# Patient Record
Sex: MALE | HISPANIC OR LATINO | Employment: UNEMPLOYED | URBAN - METROPOLITAN AREA
[De-identification: names, ages, dates, MRNs, and addresses within clinical notes are randomized per-mention and may not be internally consistent; named-entity substitution may affect disease eponyms.]

---

## 2017-01-27 ENCOUNTER — APPOINTMENT (OUTPATIENT)
Dept: RADIOLOGY | Facility: MEDICAL CENTER | Age: 22
End: 2017-01-27
Attending: EMERGENCY MEDICINE

## 2017-01-27 ENCOUNTER — HOSPITAL ENCOUNTER (EMERGENCY)
Facility: MEDICAL CENTER | Age: 22
End: 2017-01-27
Attending: EMERGENCY MEDICINE

## 2017-01-27 VITALS
SYSTOLIC BLOOD PRESSURE: 121 MMHG | WEIGHT: 127.87 LBS | HEIGHT: 66 IN | RESPIRATION RATE: 18 BRPM | TEMPERATURE: 98.2 F | HEART RATE: 73 BPM | BODY MASS INDEX: 20.55 KG/M2 | OXYGEN SATURATION: 98 % | DIASTOLIC BLOOD PRESSURE: 59 MMHG

## 2017-01-27 DIAGNOSIS — R10.13 EPIGASTRIC PAIN: ICD-10-CM

## 2017-01-27 LAB
ALBUMIN SERPL BCP-MCNC: 5.3 G/DL (ref 3.2–4.9)
ALBUMIN/GLOB SERPL: 1.8 G/DL
ALP SERPL-CCNC: 128 U/L (ref 30–99)
ALT SERPL-CCNC: 14 U/L (ref 2–50)
ANION GAP SERPL CALC-SCNC: 12 MMOL/L (ref 0–11.9)
AST SERPL-CCNC: 21 U/L (ref 12–45)
BASOPHILS # BLD AUTO: 0.1 % (ref 0–1.8)
BASOPHILS # BLD: 0.02 K/UL (ref 0–0.12)
BILIRUB SERPL-MCNC: 0.9 MG/DL (ref 0.1–1.5)
BUN SERPL-MCNC: 18 MG/DL (ref 8–22)
CALCIUM SERPL-MCNC: 10.2 MG/DL (ref 8.5–10.5)
CHLORIDE SERPL-SCNC: 103 MMOL/L (ref 96–112)
CO2 SERPL-SCNC: 21 MMOL/L (ref 20–33)
CREAT SERPL-MCNC: 0.96 MG/DL (ref 0.5–1.4)
EOSINOPHIL # BLD AUTO: 0 K/UL (ref 0–0.51)
EOSINOPHIL NFR BLD: 0 % (ref 0–6.9)
ERYTHROCYTE [DISTWIDTH] IN BLOOD BY AUTOMATED COUNT: 39.2 FL (ref 35.9–50)
GFR SERPL CREATININE-BSD FRML MDRD: >60 ML/MIN/1.73 M 2
GLOBULIN SER CALC-MCNC: 3 G/DL (ref 1.9–3.5)
GLUCOSE SERPL-MCNC: 103 MG/DL (ref 65–99)
HCT VFR BLD AUTO: 41.6 % (ref 42–52)
HGB BLD-MCNC: 14.7 G/DL (ref 14–18)
IMM GRANULOCYTES # BLD AUTO: 0.06 K/UL (ref 0–0.11)
IMM GRANULOCYTES NFR BLD AUTO: 0.4 % (ref 0–0.9)
LIPASE SERPL-CCNC: 29 U/L (ref 11–82)
LYMPHOCYTES # BLD AUTO: 1 K/UL (ref 1–4.8)
LYMPHOCYTES NFR BLD: 6.8 % (ref 22–41)
MCH RBC QN AUTO: 31.4 PG (ref 27–33)
MCHC RBC AUTO-ENTMCNC: 35.3 G/DL (ref 33.7–35.3)
MCV RBC AUTO: 88.9 FL (ref 81.4–97.8)
MONOCYTES # BLD AUTO: 0.54 K/UL (ref 0–0.85)
MONOCYTES NFR BLD AUTO: 3.7 % (ref 0–13.4)
NEUTROPHILS # BLD AUTO: 13.06 K/UL (ref 1.82–7.42)
NEUTROPHILS NFR BLD: 89 % (ref 44–72)
NRBC # BLD AUTO: 0 K/UL
NRBC BLD AUTO-RTO: 0 /100 WBC
PLATELET # BLD AUTO: 216 K/UL (ref 164–446)
PMV BLD AUTO: 10.4 FL (ref 9–12.9)
POTASSIUM SERPL-SCNC: 3.8 MMOL/L (ref 3.6–5.5)
PROT SERPL-MCNC: 8.3 G/DL (ref 6–8.2)
RBC # BLD AUTO: 4.68 M/UL (ref 4.7–6.1)
SODIUM SERPL-SCNC: 136 MMOL/L (ref 135–145)
WBC # BLD AUTO: 14.7 K/UL (ref 4.8–10.8)

## 2017-01-27 PROCEDURE — 700105 HCHG RX REV CODE 258: Performed by: EMERGENCY MEDICINE

## 2017-01-27 PROCEDURE — 36415 COLL VENOUS BLD VENIPUNCTURE: CPT

## 2017-01-27 PROCEDURE — 96361 HYDRATE IV INFUSION ADD-ON: CPT

## 2017-01-27 PROCEDURE — 80053 COMPREHEN METABOLIC PANEL: CPT

## 2017-01-27 PROCEDURE — 74177 CT ABD & PELVIS W/CONTRAST: CPT

## 2017-01-27 PROCEDURE — 96366 THER/PROPH/DIAG IV INF ADDON: CPT

## 2017-01-27 PROCEDURE — 99284 EMERGENCY DEPT VISIT MOD MDM: CPT

## 2017-01-27 PROCEDURE — A9270 NON-COVERED ITEM OR SERVICE: HCPCS | Performed by: EMERGENCY MEDICINE

## 2017-01-27 PROCEDURE — 700117 HCHG RX CONTRAST REV CODE 255: Performed by: EMERGENCY MEDICINE

## 2017-01-27 PROCEDURE — 83690 ASSAY OF LIPASE: CPT

## 2017-01-27 PROCEDURE — 700102 HCHG RX REV CODE 250 W/ 637 OVERRIDE(OP): Performed by: EMERGENCY MEDICINE

## 2017-01-27 PROCEDURE — 85025 COMPLETE CBC W/AUTO DIFF WBC: CPT

## 2017-01-27 PROCEDURE — 96365 THER/PROPH/DIAG IV INF INIT: CPT

## 2017-01-27 PROCEDURE — 700111 HCHG RX REV CODE 636 W/ 250 OVERRIDE (IP): Performed by: EMERGENCY MEDICINE

## 2017-01-27 RX ORDER — METOCLOPRAMIDE 10 MG/1
10 TABLET ORAL ONCE
Status: COMPLETED | OUTPATIENT
Start: 2017-01-27 | End: 2017-01-27

## 2017-01-27 RX ORDER — SODIUM CHLORIDE 9 MG/ML
2000 INJECTION, SOLUTION INTRAVENOUS ONCE
Status: COMPLETED | OUTPATIENT
Start: 2017-01-27 | End: 2017-01-27

## 2017-01-27 RX ORDER — MORPHINE SULFATE 4 MG/ML
4 INJECTION, SOLUTION INTRAMUSCULAR; INTRAVENOUS ONCE
Status: COMPLETED | OUTPATIENT
Start: 2017-01-27 | End: 2017-01-27

## 2017-01-27 RX ORDER — KETOROLAC TROMETHAMINE 30 MG/ML
30 INJECTION, SOLUTION INTRAMUSCULAR; INTRAVENOUS ONCE
Status: DISCONTINUED | OUTPATIENT
Start: 2017-01-27 | End: 2017-01-27

## 2017-01-27 RX ORDER — ONDANSETRON 2 MG/ML
4 INJECTION INTRAMUSCULAR; INTRAVENOUS ONCE
Status: COMPLETED | OUTPATIENT
Start: 2017-01-27 | End: 2017-01-27

## 2017-01-27 RX ADMIN — SODIUM CHLORIDE 2000 ML: 9 INJECTION, SOLUTION INTRAVENOUS at 11:20

## 2017-01-27 RX ADMIN — FAMOTIDINE 20 MG: 10 INJECTION INTRAVENOUS at 11:50

## 2017-01-27 RX ADMIN — IOHEXOL 80 ML: 350 INJECTION, SOLUTION INTRAVENOUS at 12:24

## 2017-01-27 RX ADMIN — METOCLOPRAMIDE 10 MG: 10 TABLET ORAL at 11:57

## 2017-01-27 RX ADMIN — MORPHINE SULFATE 4 MG: 4 INJECTION INTRAVENOUS at 11:55

## 2017-01-27 RX ADMIN — ONDANSETRON 4 MG: 2 INJECTION, SOLUTION INTRAMUSCULAR; INTRAVENOUS at 11:45

## 2017-01-27 ASSESSMENT — PAIN SCALES - GENERAL
PAINLEVEL_OUTOF10: 4
PAINLEVEL_OUTOF10: 0

## 2017-01-27 ASSESSMENT — LIFESTYLE VARIABLES: DO YOU DRINK ALCOHOL: NO

## 2017-01-27 NOTE — ED NOTES
Pt amb to triage.  Chief Complaint   Patient presents with   • Abdominal Pain     cenralized   • Nausea     Symptoms since last night.  Pt given urine collection supplies. Instructed on clean catch technique.  Pt asked to wait in lobby. Advised of wait time and triage process. Advised to alert RN w/ any changes in condition. Thanked for patience.

## 2017-01-27 NOTE — DISCHARGE INSTRUCTIONS
The CT scan and your white blood cell count suggests possible early appendicitis however your symptoms do not seem to suggest appendicitis. Therefore we will let you go home tonight but you should return here 1st thing in the morning for mandatory reexamination. You should return here at once if you have recurrent pain or fever or any new or worsening symptoms or concerns.

## 2017-01-27 NOTE — ED NOTES
".  Chief Complaint   Patient presents with   • Abdominal Pain     cenralized   • Nausea   Sudden onset (above umbilicus) at 2200 Thursday evening. Pt states \" pain feels more like a cramp.\" Pain constant. No nausea at this time. Last urine output and BM yesterday. Fever? + chills.     "

## 2017-01-27 NOTE — ED NOTES
All lines and monitors disconnected.  Discharge instructions reviewed, questions answered.  Pt to geovanna, escorted by RN.  Pt states all belongings in possession.

## 2017-01-27 NOTE — ED AVS SNAPSHOT
[employer] Access Code: [email or web address]  Expires: 2/26/2017  3:01 PM    Your email address is not on file at [facility].  Email Addresses are required for you to sign up for [employer], please contact [phone] to verify your personal information and to provide your email address prior to attempting to register for [employer].    Orville Pinto  [address] [place] [address]    [employer]  A secure, online tool to manage your health information     [facility]’s [employer]® is a secure, online tool that connects you to your personalized health information from the privacy of your home -- day or night - making it very easy for you to manage your healthcare. Once the activation process is completed, you can even access your medical information using the [employer] edgard, which is available for free in the Apple Edgard store or Google Play store.     To learn more about [employer], visit [email or web address]    There are two levels of access available (as shown below):   My Chart Features  [facility] Primary Care Doctor [facility]  Specialists [facility]  Urgent  Care Non-[facility] Primary Care Doctor   Email your healthcare team securely and privately 24/7 X X X    Manage appointments: schedule your next appointment; view details of past/upcoming appointments X      Request prescription refills. X      View recent personal medical records, including lab and immunizations X X X X   View health record, including health history, allergies, medications X X X X   Read reports about your outpatient visits, procedures, consult and ER notes X X X X   See your discharge summary, which is a recap of your hospital and/or ER visit that includes your diagnosis, lab results, and care plan X X  X     How to register for [employer]:  Once your e-mail address has been verified, follow the following steps to sign up for [employer].     1. Go to  [email or web address]  2. Click on the Sign Up Now box, which takes you to the New Member Sign Up page. You will  need to provide the following information:  a. Enter your [employer] Access Code exactly as it appears at the top of this page. (You will not need to use this code after you’ve completed the sign-up process. If you do not sign up before the expiration date, you must request a new code.)   b. Enter your date of birth.   c. Enter your home email address.   d. Click Submit, and follow the next screen’s instructions.  3. Create a [employer] ID. This will be your [employer] login ID and cannot be changed, so think of one that is secure and easy to remember.  4. Create a [employer] password. You can change your password at any time.  5. Enter your Password Reset Question and Answer. This can be used at a later time if you forget your password.   6. Enter your e-mail address. This allows you to receive e-mail notifications when new information is available in [employer].  7. Click Sign Up. You can now view your health information.    For assistance activating your [employer] account, call [phone]

## 2017-01-27 NOTE — ED PROVIDER NOTES
"ED Provider Note    CHIEF COMPLAINT  Chief Complaint   Patient presents with   • Abdominal Pain     cenralized   • Nausea       HPI  Orville Pinto is a 21 y.o. male who presents to the emergency department complaining of epigastric abdominal pain. Symptoms came on approximately 10 PM last night after eating a hotdog and the patient tried Pepto-Bismol and T and that really didn't seem to help. The patient has been feeling constipated. He's had some slight nausea and chills but no fever. The patient's last bowel movement was yesterday during the daytime. He is also noted decreased urine output since yesterday. The patient says that a couple of years ago he was seen in the emergency department with right lower quadrant abdominal pain which resolved and required no further interventions.    REVIEW OF SYSTEMS he has had chills but no fever, nausea but no vomiting, he's had some constipation but no diarrhea. No testicular or scrotal pain. At no time has he had pain in the lower abdomen. All other systems negative    PAST MEDICAL HISTORY  No past medical history on file.    FAMILY HISTORY  No family history on file.    SOCIAL HISTORY  Social History     Social History   • Marital Status: Single     Spouse Name: N/A   • Number of Children: N/A   • Years of Education: N/A     Social History Main Topics   • Smoking status: Former Smoker   • Smokeless tobacco: Not on file   • Alcohol Use: No   • Drug Use: No   • Sexual Activity: Not on file     Other Topics Concern   • Not on file     Social History Narrative   • No narrative on file       SURGICAL HISTORY  No past surgical history on file.    CURRENT MEDICATIONS  Home Medications     **Home medications have not yet been reviewed for this encounter**          ALLERGIES  No Known Allergies    PHYSICAL EXAM  VITAL SIGNS: /52 mmHg  Pulse 71  Temp(Src) 36.8 °C (98.2 °F) (Temporal)  Resp 18  Ht 1.676 m (5' 6\")  Wt 58 kg (127 lb 13.9 oz)  BMI 20.65 kg/m2  SpO2 98% "   Oxygen saturation is interpreted as adequate  Constitutional: Awake and well-appearing individual in no distress  HENT: Mucous membranes are moist and throat clear  Eyes: Erythema or discharge or jaundice  Neck: Trachea midline no JVD no meningeal findings  Cardiovascular: Regular rate and rhythm  Lungs: Clear and equal bilaterally with no apparent difficulty breathing  Abdomen/Back: Soft and nondistended with no rebound or guarding or peritoneal findings. The patient says his discomfort is mostly in the epigastrium but he is not tender in that area and he has absolutely no lower quadrant tenderness. Bowel sounds are normally active.  Skin: Warm and dry  Musculoskeletal: No acute bony deformity  Neurologic: Awake and verbal and moving all extremities    Laboratory  A CBC shows a white blood cell count slightly elevated at 14.7, complete metabolic panel is generally unremarkable except for an alk phos of 128 and the lipase is normal at 29.    Radiology  CT-ABDOMEN-PELVIS WITH   Final Result      1.  Minimal dilatation of the appendix. Possible appendicolith at the orifice of the appendix.      2.  No periappendiceal fat infiltration. No free fluid or abscess.      3.  Possible early appendicitis. Correlation with physical exam is recommended.      4.  Otherwise negative CT abdomen and pelvis.        MEDICAL DECISION MAKING and DISPOSITION  In the emergency department an IV was established the patient was given intravenous fluids as well as intravenous morphine and Pepcid and Zofran and Reglan. The morphine was administered at 11:55 AM. I have reviewed all the findings with the patient and he states that he is completely pain-free. I have reexamined him and he has absolutely no tenderness anywhere in the abdomen. I was able to palpate deeply and vigorously in all 4 abdominal quadrants and the epigastrium and I cannot find any tenderness anywhere. I had the patient stand up and jump up and down at the bedside and he  says he has no pain. I have reviewed all the findings with the patient including the fact that he has a slightly elevated white blood cell count and the CT findings suggesting possible early appendicitis and I have discussed with him in detail that the CT findings do not correlate with his physical exam. It has been 3 hours since the patient received morphine and I do not think that his symptoms are being masked I this medication. At this point in time based on his clinical exam I do not think that he has acute appendicitis. Since he does have an elevated white blood cell count and the CT reading as noted above I'm going to have him come back tomorrow morning for mandatory reexamination. And he understands that he is to return here at once if he has any new or worsening symptoms or any pain whatsoever as this may indicate early appendicitis. The patient is to drink lots of fluids to maintain hydration and he may use Tylenol and Motrin if needed for discomfort    IMPRESSION  1. Abdominal pain, resolved  2. Elevated white blood cell count         Electronically signed by: Jax Loyd, 1/27/2017 2:57 PM

## 2017-01-27 NOTE — ED AVS SNAPSHOT
1/27/2017          Orville Pinto  [address] [address]    Dear Orville MOORE:    [facility] wants to ensure your discharge home is safe and you or your loved ones have had all your questions answered regarding your care after you leave the hospital.    You may receive a telephone call within two days of your discharge.  This call is to make certain you understand your discharge instructions as well as ensure we provided you with the best care possible during your stay with us.     The call will only last approximately 3-5 minutes and will be done by a nurse.    Once again, we want to ensure your discharge home is safe and that you have a clear understanding of any next steps in your care.  If you have any questions or concerns, please do not hesitate to contact us, we are here for you.  Thank you for choosing [facility] for your healthcare needs.    Sincerely,    Zach Palacio    [facility]

## 2017-01-27 NOTE — ED AVS SNAPSHOT
After Visit Summary                                                                                                                Orville Pinto Jr.   MRN: [id number]    Department:  [facility], Emergency Dept   Date of Visit:  1/27/2017            [facility], Emergency Dept    [address]    [place] [address]    Phone:  [phone]      You were seen by     Jax Loyd M.D.      Your Diagnosis Was     Epigastric pain     R10.13       These are the medications you received during your hospitalization from 01/27/2017 1018 to 01/27/2017 1501     Date/Time Order Dose Route Action    01/27/2017 1120 NS infusion 2,000 mL 2,000 mL Intravenous New Bag    01/27/2017 1145 ondansetron (ZOFRAN) syringe/vial injection 4 mg 4 mg Intravenous Given    01/27/2017 1150 famotidine (PEPCID) injection 20 mg 20 mg Intravenous Given    01/27/2017 1155 morphine (pf) 4 mg/ml injection 4 mg 4 mg Intravenous Given    01/27/2017 1157 metoclopramide (REGLAN) tablet 10 mg 10 mg Oral Given    01/27/2017 1224 iohexol (OMNIPAQUE) 350 mg/mL 80 mL Intravenous Given      Medication Information     Review all of your home medications and newly ordered medications with your primary doctor and/or pharmacist as soon as possible. Follow medication instructions as directed by your doctor and/or pharmacist.     Please keep your complete medication list with you and share with your physician. Update the information when medications are discontinued, doses are changed, or new medications (including over-the-counter products) are added; and carry medication information at all times in the event of emergency situations.               Medication List      ASK your doctor about these medications        Instructions    ibuprofen 200 MG Tabs   Commonly known as:  MOTRIN    Take 400 mg by mouth every 6 hours as needed.   Dose:  400 mg               Procedures and tests performed during your visit     CBC WITH DIFFERENTIAL     COMP METABOLIC PANEL    CONSENT FOR CONTRAST INJECTION    CT-ABDOMEN-PELVIS WITH    ESTIMATED GFR    IV Saline Lock    LIPASE        Discharge Instructions       The CT scan and your white blood cell count suggests possible early appendicitis however your symptoms do not seem to suggest appendicitis. Therefore we will let you go home tonight but you should return here 1st thing in the morning for mandatory reexamination. You should return here at once if you have recurrent pain or fever or any new or worsening symptoms or concerns.          Patient Information     Patient Information    Following emergency treatment: all patient requiring follow-up care must return either to a private physician or a clinic if your condition worsens before you are able to obtain further medical attention, please return to the emergency room.     Billing Information    At [facility], we work to make the billing process streamlined for our patients.  Our Representatives are here to answer any questions you may have regarding your hospital bill.  If you have insurance coverage and have supplied your insurance information to us, we will submit a claim to your insurer on your behalf.  Should you have any questions regarding your bill, we can be reached online or by phone as follows:  Online: You are able pay your bills online or live chat with our representatives about any billing questions you may have. We are here to help Monday - Friday from 8:00am to 7:30pm and 9:00am - 12:00pm on Saturdays.  Please visit [email or web address]  for more information.   Phone:  [phone] or [phone]    Please note that your emergency physician, surgeon, pathologist, radiologist, anesthesiologist, and other specialists are not employed by [facility] and will therefore bill separately for their services.  Please contact them directly for any questions concerning their bills at the numbers below:     Emergency  Physician Services:  [phone]  [facility]:  [phone]  [facility]:  [phone]  [facility]:  [phone]    1. Your final bill may vary from the amount quoted upon discharge if all procedures are not complete at that time, or if your doctor has additional procedures of which we are not aware. You will receive an additional bill if you return to the Emergency Department at formerly [facility] for suture removal regardless of the facility of which the sutures were placed.     2. Please arrange for settlement of this account at the emergency registration.    3. All self-pay accounts are due in full at the time of treatment.  If you are unable to meet this obligation then payment is expected within 4-5 days.     4. If you have had radiology studies (CT, X-ray, Ultrasound, MRI), you have received a preliminary result during your emergency department visit. Please contact the radiology department [phone] to receive a copy of your final result. Please discuss the Final result with your primary physician or with the follow up physician provided.     Crisis Hotline:  [place] Crisis Hotline:  5-987-JJUXSHI or 1-617.211.7582  [place] Crisis Hotline:    1-636.236.2430 or 092-796-8389         ED Discharge Follow Up Questions    1. In order to provide you with very good care, we would like to follow up with a phone call in the next few days.  May we have your permission to contact you?     YES /  NO    2. What is the best phone number to call you? (       )_____-__________    3. What is the best time to call you?      Morning  /  Afternoon  /  Evening                   Patient Signature:  ____________________________________________________________    Date:  ____________________________________________________________

## 2019-03-02 ENCOUNTER — HOSPITAL ENCOUNTER (EMERGENCY)
Facility: MEDICAL CENTER | Age: 24
End: 2019-03-03
Attending: EMERGENCY MEDICINE

## 2019-03-02 ENCOUNTER — APPOINTMENT (OUTPATIENT)
Dept: RADIOLOGY | Facility: MEDICAL CENTER | Age: 24
End: 2019-03-02
Attending: EMERGENCY MEDICINE

## 2019-03-02 VITALS
DIASTOLIC BLOOD PRESSURE: 56 MMHG | WEIGHT: 142.86 LBS | OXYGEN SATURATION: 95 % | BODY MASS INDEX: 23.8 KG/M2 | TEMPERATURE: 98.2 F | SYSTOLIC BLOOD PRESSURE: 117 MMHG | HEART RATE: 67 BPM | HEIGHT: 65 IN | RESPIRATION RATE: 15 BRPM

## 2019-03-02 DIAGNOSIS — L03.211 FACIAL CELLULITIS: ICD-10-CM

## 2019-03-02 DIAGNOSIS — K04.7 DENTAL ABSCESS: ICD-10-CM

## 2019-03-02 LAB
ANION GAP SERPL CALC-SCNC: 9 MMOL/L (ref 0–11.9)
BUN SERPL-MCNC: 9 MG/DL (ref 8–22)
CALCIUM SERPL-MCNC: 9.1 MG/DL (ref 8.5–10.5)
CHLORIDE SERPL-SCNC: 106 MMOL/L (ref 96–112)
CO2 SERPL-SCNC: 25 MMOL/L (ref 20–33)
CREAT SERPL-MCNC: 0.86 MG/DL (ref 0.5–1.4)
GLUCOSE SERPL-MCNC: 109 MG/DL (ref 65–99)
POTASSIUM SERPL-SCNC: 3.5 MMOL/L (ref 3.6–5.5)
SODIUM SERPL-SCNC: 140 MMOL/L (ref 135–145)

## 2019-03-02 PROCEDURE — 700111 HCHG RX REV CODE 636 W/ 250 OVERRIDE (IP): Mod: EDC | Performed by: EMERGENCY MEDICINE

## 2019-03-02 PROCEDURE — 87040 BLOOD CULTURE FOR BACTERIA: CPT | Mod: EDC

## 2019-03-02 PROCEDURE — 700117 HCHG RX CONTRAST REV CODE 255: Mod: EDC | Performed by: EMERGENCY MEDICINE

## 2019-03-02 PROCEDURE — 96375 TX/PRO/DX INJ NEW DRUG ADDON: CPT | Mod: EDC

## 2019-03-02 PROCEDURE — 99284 EMERGENCY DEPT VISIT MOD MDM: CPT | Mod: EDC

## 2019-03-02 PROCEDURE — 96365 THER/PROPH/DIAG IV INF INIT: CPT | Mod: EDC

## 2019-03-02 PROCEDURE — 36415 COLL VENOUS BLD VENIPUNCTURE: CPT | Mod: EDC

## 2019-03-02 PROCEDURE — 70487 CT MAXILLOFACIAL W/DYE: CPT

## 2019-03-02 PROCEDURE — 80048 BASIC METABOLIC PNL TOTAL CA: CPT | Mod: EDC

## 2019-03-02 PROCEDURE — 700101 HCHG RX REV CODE 250: Mod: EDC | Performed by: EMERGENCY MEDICINE

## 2019-03-02 RX ORDER — CLINDAMYCIN PHOSPHATE 600 MG/50ML
600 INJECTION, SOLUTION INTRAVENOUS ONCE
Status: COMPLETED | OUTPATIENT
Start: 2019-03-02 | End: 2019-03-02

## 2019-03-02 RX ORDER — KETOROLAC TROMETHAMINE 30 MG/ML
15 INJECTION, SOLUTION INTRAMUSCULAR; INTRAVENOUS ONCE
Status: COMPLETED | OUTPATIENT
Start: 2019-03-02 | End: 2019-03-02

## 2019-03-02 RX ADMIN — IOHEXOL 80 ML: 350 INJECTION, SOLUTION INTRAVENOUS at 23:06

## 2019-03-02 RX ADMIN — CLINDAMYCIN IN 5 PERCENT DEXTROSE 600 MG: 12 INJECTION, SOLUTION INTRAVENOUS at 22:53

## 2019-03-02 RX ADMIN — KETOROLAC TROMETHAMINE 15 MG: 30 INJECTION, SOLUTION INTRAMUSCULAR at 22:53

## 2019-03-03 PROCEDURE — 700102 HCHG RX REV CODE 250 W/ 637 OVERRIDE(OP): Mod: EDC | Performed by: EMERGENCY MEDICINE

## 2019-03-03 PROCEDURE — A9270 NON-COVERED ITEM OR SERVICE: HCPCS | Mod: EDC | Performed by: EMERGENCY MEDICINE

## 2019-03-03 PROCEDURE — 700101 HCHG RX REV CODE 250: Mod: EDC | Performed by: EMERGENCY MEDICINE

## 2019-03-03 RX ORDER — CHLORHEXIDINE GLUCONATE ORAL RINSE 1.2 MG/ML
15 SOLUTION DENTAL 2 TIMES DAILY
Qty: 1 BOTTLE | Refills: 0 | Status: SHIPPED | OUTPATIENT
Start: 2019-03-03 | End: 2019-03-10

## 2019-03-03 RX ORDER — AMOXICILLIN AND CLAVULANATE POTASSIUM 875; 125 MG/1; MG/1
1 TABLET, FILM COATED ORAL 2 TIMES DAILY
Qty: 14 TAB | Refills: 0 | Status: SHIPPED | OUTPATIENT
Start: 2019-03-03 | End: 2019-03-10

## 2019-03-03 RX ORDER — LIDOCAINE HYDROCHLORIDE AND EPINEPHRINE 10; 10 MG/ML; UG/ML
20 INJECTION, SOLUTION INFILTRATION; PERINEURAL ONCE
Status: COMPLETED | OUTPATIENT
Start: 2019-03-03 | End: 2019-03-03

## 2019-03-03 RX ORDER — AMOXICILLIN AND CLAVULANATE POTASSIUM 875; 125 MG/1; MG/1
1 TABLET, FILM COATED ORAL ONCE
Status: COMPLETED | OUTPATIENT
Start: 2019-03-03 | End: 2019-03-03

## 2019-03-03 RX ADMIN — LIDOCAINE HYDROCHLORIDE,EPINEPHRINE BITARTRATE 20 ML: 10; .01 INJECTION, SOLUTION INFILTRATION; PERINEURAL at 00:30

## 2019-03-03 RX ADMIN — AMOXICILLIN AND CLAVULANATE POTASSIUM 1 TABLET: 875; 125 TABLET, FILM COATED ORAL at 01:15

## 2019-03-03 NOTE — DISCHARGE INSTRUCTIONS
Today your CT scan showed: 1.  Erosion through right lateral maxillary bone of first molar tooth roots with associated overlying odontogenic abscess.  2.  Mild right maxillary sinusitis changes  3.  Right facial cellulitis.

## 2019-03-03 NOTE — ED PROVIDER NOTES
ED Provider Note  ED Provider Note    CHIEF COMPLAINT  Chief Complaint   Patient presents with   • Dental Pain     Pt. states right upper molar pain that is causing right sided facial swelling. pt. states that the dental pain began approx. 2 days ago. pt. has no s/s of airway compromise or diff. breathing. Pt. is talking in full and complete sentences and is correctly managing oral secretions.    • Facial Swelling       HPI  Orville Pinto is a 23 y.o. male who presents to the Emergency Department p/w CC of dental pain and right facial swelling.  Swelling began pt states that swelling significantly worsened yesterday.    Pt reports upper R tooth pain on and off for last few months.   No prior medical problems.     REVIEW OF SYSTEMS  Pertinent negatives include no fever or problems moving neck or reported neck pain.      PAST MEDICAL HISTORY       SURGICAL HISTORY  patient denies any surgical history    SOCIAL HISTORY  Social History   Substance Use Topics   • Smoking status: Former Smoker   • Smokeless tobacco: Never Used   • Alcohol use No      History   Drug Use No       FAMILY HISTORY  History reviewed. No pertinent family history.    CURRENT MEDICATIONS  Home Medications     Reviewed by Pepper Cobian R.N. (Registered Nurse) on 03/02/19 at 2144  Med List Status: Partial   Medication Last Dose Status   ibuprofen (MOTRIN) 200 MG TABS  Active                ALLERGIES  No Known Allergies    PHYSICAL EXAM  Physical Exam   Constitutional: He is oriented to person, place, and time and well-developed, well-nourished, and in no distress.   HENT:   Head: Normocephalic.   Right Ear: External ear normal.   Left Ear: External ear normal.   Mouth/Throat: No oropharyngeal exudate.    Right facial swelling and erythema and right superior dental tenderness palpation throughout all molars.  No obvious abscess seen that I can drain.  No tongue elevation.  No submandibular crepitus or erythema.  No evidence of PTA.   Eyes:  "Pupils are equal, round, and reactive to light. EOM are normal. No scleral icterus.   Neck: Normal range of motion.   Cardiovascular: Normal rate.    Pulmonary/Chest: Effort normal. No respiratory distress.   Abdominal: He exhibits no distension. There is no tenderness.   Musculoskeletal: Normal range of motion. He exhibits no deformity.   Neurological: He is alert and oriented to person, place, and time. Coordination normal.   Skin: Skin is warm and dry. No rash noted. No erythema.   Psychiatric: Affect and judgment normal.   Nursing note and vitals reviewed.     Physical Exam   Constitutional: He is oriented to person, place, and time and well-developed, well-nourished, and in no distress.   HENT:   Head: Normocephalic.   Right Ear: External ear normal.   Left Ear: External ear normal.   Mouth/Throat: No oropharyngeal exudate.    Right facial swelling and erythema and right superior dental tenderness palpation throughout all molars.  No obvious abscess seen that I can drain.  No tongue elevation.  No submandibular crepitus or erythema.  No evidence of PTA.   Eyes: Pupils are equal, round, and reactive to light. EOM are normal. No scleral icterus.   Neck: Normal range of motion.   Cardiovascular: Normal rate.    Pulmonary/Chest: Effort normal. No respiratory distress.   Abdominal: He exhibits no distension. There is no tenderness.   Musculoskeletal: Normal range of motion. He exhibits no deformity.   Neurological: He is alert and oriented to person, place, and time. Coordination normal.   Skin: Skin is warm and dry. No rash noted. No erythema.   Psychiatric: Affect and judgment normal.   Nursing note and vitals reviewed.      VITAL SIGNS: /56   Pulse 67   Temp 36.8 °C (98.2 °F) (Temporal)   Resp 15   Ht 1.651 m (5' 5\")   Wt 64.8 kg (142 lb 13.7 oz)   SpO2 95%   BMI 23.77 kg/m²  @LUCY[[id number]::@      LABS  Labs Reviewed   BASIC METABOLIC PANEL - Abnormal; Notable for the following:        Result Value    " "Potassium 3.5 (*)     Glucose 109 (*)     All other components within normal limits   BLOOD CULTURE    Narrative:     Per Hospital Policy: Only change Specimen Src: to \"Line\" if  specified by physician order.   BLOOD CULTURE    Narrative:     Per Hospital Policy: Only change Specimen Src: to \"Line\" if  specified by physician order.   ESTIMATED GFR      All labs reviewed by me.    RADIOLOGY  CT-MAXILLOFACIAL WITH PLUS RECONS   Final Result         1.  Erosion through right lateral maxillary bone of first molar tooth roots with associated overlying odontogenic abscess.   2.  Mild right maxillary sinusitis changes   3.  Right facial cellulitis.        The radiologist's interpretation of all radiological studies have been reviewed by me.    COURSE & MEDICAL DECISION MAKING  Nursing notes, VS, PMSFHx reviewed in chart.    10:33 PM Patient seen and examined at bedside.     23 y.o. male p/w CC of right facial swelling and pain    Differential diagnosis includes is not limited to:  Upon arrival patient's history and physical exam consistent with facial cellulitis and concern for abscess  -CT scan ordered of patient's face given concern for abscess.  Upon arrival clindamycin ordered given concern for cellulitis and infection    -Patient with otogenic abscess.  Consulted Dr. Gann who will attempt bedside extraction.     Patient discharged on Augmentin per Dr Gann and chlorhexidine mouthwash and will follow-up in clinic as needed      DISPOSITION:  Patient will be discharged home in stable condition.    FINAL IMPRESSION  1. Dental abscess    2. Facial cellulitis         Electronically signed by: Mikie Patel, 3/2/2019 10:33 PM          "

## 2019-03-03 NOTE — ED TRIAGE NOTES
"Orville Pinto  23 y.o. male  Chief Complaint   Patient presents with   • Dental Pain     Pt. states right upper molar pain that is causing right sided facial swelling. pt. states that the dental pain began approx. 2 days ago. pt. has no s/s of airway compromise or diff. breathing. Pt. is talking in full and complete sentences and is correctly managing oral secretions.    • Facial Swelling     /79   Pulse 65   Temp 36.8 °C (98.2 °F) (Temporal)   Resp 15   Ht 1.651 m (5' 5\")   Wt 64.8 kg (142 lb 13.7 oz)   SpO2 98%   BMI 23.77 kg/m²     Pt amb to triage with steady gait for above complaint.   Pt is alert and oriented, speaking in full sentences, follows commands and responds appropriately to questions. NAD. Resp are even and unlabored.  Pt placed in lobby. Pt educated on triage process. Pt encouraged to alert staff for any changes.  "

## 2019-03-08 LAB
BACTERIA BLD CULT: NORMAL
BACTERIA BLD CULT: NORMAL
SIGNIFICANT IND 70042: NORMAL
SIGNIFICANT IND 70042: NORMAL
SITE SITE: NORMAL
SITE SITE: NORMAL
SOURCE SOURCE: NORMAL
SOURCE SOURCE: NORMAL

## 2019-03-25 NOTE — PROCEDURES
Procure note:     Dx: Non-restorable/carious #3   Right buccal space infection     Procedure: Surgical ext #3 and I+D infection   Local: lido 1% with 1:100k epi   FTMPF   Drained copious purulence   Elevate and deliver #3 with forceps.   Irrigation. Smooth bone.     Plan for D/C to home with peridex and abx   F/U PRN